# Patient Record
Sex: FEMALE
[De-identification: names, ages, dates, MRNs, and addresses within clinical notes are randomized per-mention and may not be internally consistent; named-entity substitution may affect disease eponyms.]

---

## 2018-11-19 PROBLEM — Z00.00 ENCOUNTER FOR PREVENTIVE HEALTH EXAMINATION: Status: ACTIVE | Noted: 2018-11-19

## 2018-12-05 ENCOUNTER — APPOINTMENT (OUTPATIENT)
Dept: ORTHOPEDIC SURGERY | Facility: CLINIC | Age: 43
End: 2018-12-05
Payer: COMMERCIAL

## 2018-12-05 VITALS
DIASTOLIC BLOOD PRESSURE: 70 MMHG | OXYGEN SATURATION: 98 % | SYSTOLIC BLOOD PRESSURE: 119 MMHG | BODY MASS INDEX: 21.19 KG/M2 | HEART RATE: 46 BPM | WEIGHT: 148 LBS | HEIGHT: 70 IN

## 2018-12-05 DIAGNOSIS — Z87.39 PERSONAL HISTORY OF OTHER DISEASES OF THE MUSCULOSKELETAL SYSTEM AND CONNECTIVE TISSUE: ICD-10-CM

## 2018-12-05 DIAGNOSIS — Z78.9 OTHER SPECIFIED HEALTH STATUS: ICD-10-CM

## 2018-12-05 DIAGNOSIS — Z82.62 FAMILY HISTORY OF OSTEOPOROSIS: ICD-10-CM

## 2018-12-05 DIAGNOSIS — Z56.0 UNEMPLOYMENT, UNSPECIFIED: ICD-10-CM

## 2018-12-05 PROCEDURE — 99204 OFFICE O/P NEW MOD 45 MIN: CPT

## 2018-12-05 PROCEDURE — 73564 X-RAY EXAM KNEE 4 OR MORE: CPT | Mod: LT

## 2018-12-05 RX ORDER — NAPROXEN 500 MG/1
500 TABLET ORAL
Refills: 0 | Status: ACTIVE | COMMUNITY

## 2018-12-05 RX ORDER — ESCITALOPRAM OXALATE 10 MG/1
10 TABLET, FILM COATED ORAL
Refills: 0 | Status: ACTIVE | COMMUNITY

## 2018-12-05 SDOH — ECONOMIC STABILITY - INCOME SECURITY: UNEMPLOYMENT, UNSPECIFIED: Z56.0

## 2018-12-05 NOTE — PHYSICAL EXAM
[LE] : Sensory: Intact in bilateral lower extremities [DP] : dorsalis pedis 2+ and symmetric bilaterally [PT] : posterior tibial 2+ and symmetric bilaterally [Normal RLE] : Right Lower Extremity: No scars, rashes, lesions, ulcers, skin intact [Normal LLE] : Left Lower Extremity: No scars, rashes, lesions, ulcers, skin intact [Normal Touch] : sensation intact for touch [Normal] : Alert and in no acute distress [de-identified] : LEFT  Knee/RIGHT for comparison:\par Nonantalgic gait. She can squat with mild LEFT knee pain no\par No effusion.\par - erythema, edema, warmth.\par Tender mildly LEFT anteromedial knee and medial patella facet. Nontender lateral knee. Nontender RIGHT knee\par ROM: 0 degrees extension to 135 degrees flexion. - crepitus\par - Sari.\par 1A Lachman.  - Pivot shift. - posterior drawer. Normal rotational, varus/valgus laxity.\par Intact extensor mechanism.\par NVI distally.\par  [de-identified] : no respiratorress [de-identified] : \par X-rays weightbearing 4 views taken today of the LEFT knee shows no evidence of any insufficiency fractures, subchondral cystic change or any joint space narrowing. There is some patellar [place]\par \par I reviewed the MRI of the LEFT knee from September 19 , 2018 showing significant intrameniscal signal within the posterior medial meniscus without obvious tear, normal lateral meniscus, slight signal in the proximal PCL possibly consistent with a partial old tear, bone marrow edema in the posterior weightbearing medial tibial plateau similar to the last MRI, strain in the lateral gastrocnemius, small to moderate effusion, changes that could be consistent with fat pad syndrome and some insertional quad tendinopathy.

## 2018-12-05 NOTE — REVIEW OF SYSTEMS
[Joint Pain] : joint pain [Joint Stiffness] : joint stiffness [Negative] : Heme/Lymph [Joint Swelling] : joint swelling

## 2018-12-05 NOTE — ASSESSMENT
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 She has ongoing medial knee pain preventing her from running.\par The MRI did show some bone marrow edema. She may have an insufficiency fracture or perhaps some of this is just patellofemoral. She has done a lot of physical therapy and rest and NSAIDs in addition to the various injections. That given the lack of improvement and lack of clear cause of what's causing ongoing pain, I would like for her to get a new MRI. I want to see if this bone bruise resolves or not. In the meantime limit weightbearing activity and no running. Continue strengthening exercises and physical therapy but should be nonweightbearing. She can cut the naproxen back to just once a day or as needed.\par I will call her with the MRI results and further recommendations.\par

## 2018-12-05 NOTE — HISTORY OF PRESENT ILLNESS
[de-identified] : Ms. Wright comes in because of pain in her LEFT medial knee that has been ongoing. She started having problems over 6 months ago. She has been raising doing triathlons. Pain was aggravated by a lot of running.\par At the end of May she developed pain in the lateral knee dx'd as ITB syndrome.She had an MRI on August 14 which showed a mild sprain of the lateral patellar retinaculum but no tears. \par She did PT and avoided running temporarily.\par Then as she ran again she had medial knee pain. She stopped running but did PT, bike and swimming.\par \par She ran a few days before going to [place] where she was going to see her doctor for possible PRP injection. She had had PRP injection for ITB syndrome in the other knee in the past. Her knee became really painful and there was a pop in the knee. The pain was worse than ever and she had difficulty bending the knee. She went for a 2nd MRI. \par She had BM stem cell injection in the knee joint, meniscus, and MCL and a PRP injection was done in the ITB by the orthopedist in [place].\par She rested.\par She started back exercising again but has pain again medially. 2 wks ago pain was 10/10.  She took Advil.\par She then had an Arnica injection blend injection which did nothing. Naproxyn was prescribed 500 BID which she is taking since the 16th. \par It feels fine on Naproxyn. She tried 1 per day and jogged a little and still felt pain after. \par It only swelled after the pop but generally no swelling. No locking or buckling. \par He is frustrated that her knee doesn't feel like it is getting fully better. She still gets pain in the medial side of her knee but no real lateral sided pain.He comes in for another opinion.\par \par History of any prior stress fractures. No history of DEXA scan

## 2018-12-19 ENCOUNTER — CLINICAL ADVICE (OUTPATIENT)
Age: 43
End: 2018-12-19

## 2019-01-14 ENCOUNTER — APPOINTMENT (OUTPATIENT)
Dept: ORTHOPEDIC SURGERY | Facility: CLINIC | Age: 44
End: 2019-01-14
Payer: COMMERCIAL

## 2019-01-14 DIAGNOSIS — S83.242A OTHER TEAR OF MEDIAL MENISCUS, CURRENT INJURY, LEFT KNEE, INITIAL ENCOUNTER: ICD-10-CM

## 2019-01-14 DIAGNOSIS — D75.89 OTHER SPECIFIED DISEASES OF BLOOD AND BLOOD-FORMING ORGANS: ICD-10-CM

## 2019-01-14 DIAGNOSIS — M25.562 PAIN IN LEFT KNEE: ICD-10-CM

## 2019-01-14 DIAGNOSIS — M84.451A PATHOLOGICAL FRACTURE, RIGHT FEMUR, INITIAL ENCOUNTER FOR FRACTURE: ICD-10-CM

## 2019-01-14 PROCEDURE — 99214 OFFICE O/P EST MOD 30 MIN: CPT

## 2019-01-14 NOTE — HISTORY OF PRESENT ILLNESS
[de-identified] : Ms. Wright comes in for followup for her LEFT knee.\par Her most recent MRI almost 4 weeks ago showed bone marrow edema diffusely in the medial femoral condyle with an area of subchondral signal compatible with a nondisplaced subchondral fracture and some bone marrow contusion in the medial tibial plateau as well. There was a complete tear of the posterior root of the medial meniscus with extrusion.\par I recommended first treating the insufficiency fracture and recommended that she use crutches partial weightbearing from when I spoke to her with these results 3-4 weeks ago and then we would decide on surgery for the meniscus once the fracture had healed.\par She states that her knee is feeling Better. She still has anterior knee pain with stairs. She does not have any pain medially or posteriorly. She walked with the crutches for about 3-4 weeks and then started using one crutch. She is not using the crutches at all today. No swelling. She went for a bone density test and the results are supposed to come back today. I asked for her to have them sent to me.\par She hasn't been taking any medication.

## 2019-01-14 NOTE — ASSESSMENT
[FreeTextEntry1] : 43-year-old with LEFT knee pain with a tear of the medial meniscus near the root. She had been treated prior to this by other doctors based on MRIs which did not show a tear of this extent. She had stem cell injections in the knee.\par The insufficiency fracture clinically he seems to be healing with the protected weightbearing.It's only been a month and I don't feel it is fully healed. She is not having any pain medially or posteriorly around the meniscus tear.\par \par In terms of treatment of the meniscal root tear one may consider nonoperative treatment if he became asymptomatic versus partial meniscectomy versus repair of the meniscal root. Repair would provide the best chance to restore the tension on the meniscus which currently is extruded.\par This may help decrease the risk of osteoarthritis in the future.\par The procedure and risks and benefits and alternatives were all discussed. Ultimately at the time of surgery one would decide if the tear is repairable or not. If it is repaired she would be on crutches and a brace for 6-8 weeks.\par \par She will get me the results of the bone density. She will continue with one crutch for the next couple weeks and very slowly increase the amount she is walking if there is no pain. She should do leg lifts and strengthening that her nonweightbearing. She can start physical therapy in 10 days to 2 weeks.\par she will followup in one month. She will think about the surgery and we will see how her knee is feeling and go from there.

## 2019-01-14 NOTE — PHYSICAL EXAM
[LE] : Sensory: Intact in bilateral lower extremities [DP] : dorsalis pedis 2+ and symmetric bilaterally [PT] : posterior tibial 2+ and symmetric bilaterally [Normal RLE] : Right Lower Extremity: No scars, rashes, lesions, ulcers, skin intact [Normal LLE] : Left Lower Extremity: No scars, rashes, lesions, ulcers, skin intact [de-identified] : LEFT Knee:\par Nonantalgic gait.\par no effusion.\par - erythema, edema, warmth.\par nontender joint lines, medial femora patella facets and elsewhere around the knee\par ROM: 0 degrees extension to 135 degrees flexion. mild patellofemoral crepitus\par - Sari.\par 1A Lachman.  - Pivot shift. - posterior drawer. Normal rotational, varus/valgus laxity.\par Intact extensor mechanism.\par NVI distally.\par  [de-identified] : \antwon Discussed MRI results.